# Patient Record
Sex: FEMALE
[De-identification: names, ages, dates, MRNs, and addresses within clinical notes are randomized per-mention and may not be internally consistent; named-entity substitution may affect disease eponyms.]

---

## 2021-08-25 ENCOUNTER — HOSPITAL ENCOUNTER (INPATIENT)
Dept: HOSPITAL 5 - 3A | Age: 61
LOS: 6 days | Discharge: HOME | DRG: 885 | End: 2021-08-31
Attending: PSYCHIATRY & NEUROLOGY | Admitting: PSYCHIATRY & NEUROLOGY
Payer: MEDICAID

## 2021-08-25 DIAGNOSIS — I11.0: ICD-10-CM

## 2021-08-25 DIAGNOSIS — I50.9: ICD-10-CM

## 2021-08-25 DIAGNOSIS — Z79.01: ICD-10-CM

## 2021-08-25 DIAGNOSIS — F23: Primary | ICD-10-CM

## 2021-08-25 DIAGNOSIS — E78.5: ICD-10-CM

## 2021-08-25 DIAGNOSIS — Z86.711: ICD-10-CM

## 2021-08-25 DIAGNOSIS — Z85.43: ICD-10-CM

## 2021-08-25 DIAGNOSIS — F41.9: ICD-10-CM

## 2021-08-25 DIAGNOSIS — Z85.3: ICD-10-CM

## 2021-08-25 DIAGNOSIS — G35: ICD-10-CM

## 2021-08-25 DIAGNOSIS — F31.9: ICD-10-CM

## 2021-08-25 DIAGNOSIS — J44.9: ICD-10-CM

## 2021-08-25 PROCEDURE — 36415 COLL VENOUS BLD VENIPUNCTURE: CPT

## 2021-08-25 PROCEDURE — 85730 THROMBOPLASTIN TIME PARTIAL: CPT

## 2021-08-25 PROCEDURE — 82565 ASSAY OF CREATININE: CPT

## 2021-08-25 PROCEDURE — 85027 COMPLETE CBC AUTOMATED: CPT

## 2021-08-25 PROCEDURE — 99283 EMERGENCY DEPT VISIT LOW MDM: CPT

## 2021-08-25 PROCEDURE — 80053 COMPREHEN METABOLIC PANEL: CPT

## 2021-08-25 PROCEDURE — 85025 COMPLETE CBC W/AUTO DIFF WBC: CPT

## 2021-08-25 PROCEDURE — 83036 HEMOGLOBIN GLYCOSYLATED A1C: CPT

## 2021-08-25 PROCEDURE — 85610 PROTHROMBIN TIME: CPT

## 2021-08-25 PROCEDURE — 80061 LIPID PANEL: CPT

## 2021-08-26 ENCOUNTER — HOSPITAL ENCOUNTER (EMERGENCY)
Dept: HOSPITAL 5 - ED | Age: 61
Discharge: HOME | End: 2021-08-26
Payer: MEDICAID

## 2021-08-26 VITALS — SYSTOLIC BLOOD PRESSURE: 123 MMHG | DIASTOLIC BLOOD PRESSURE: 61 MMHG

## 2021-08-26 DIAGNOSIS — Z04.6: ICD-10-CM

## 2021-08-26 DIAGNOSIS — I12.0: ICD-10-CM

## 2021-08-26 DIAGNOSIS — Z88.8: ICD-10-CM

## 2021-08-26 DIAGNOSIS — S39.012A: Primary | ICD-10-CM
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
## 2021-08-26 DIAGNOSIS — M54.2: ICD-10-CM

## 2021-08-26 DIAGNOSIS — Z88.0: ICD-10-CM

## 2021-08-26 DIAGNOSIS — F17.200: ICD-10-CM
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
## 2021-08-26 DIAGNOSIS — Z88.1: ICD-10-CM

## 2021-08-26 LAB
ALBUMIN SERPL-MCNC: 3.3 G/DL (ref 3.9–5)
ALT SERPL-CCNC: 22 UNITS/L (ref 7–56)
APTT BLD: 27.5 SEC. (ref 24.2–36.6)
BASOPHILS # (AUTO): 0.1 K/MM3 (ref 0–0.1)
BASOPHILS NFR BLD AUTO: 1.5 % (ref 0–1.8)
BUN SERPL-MCNC: 11 MG/DL (ref 7–17)
BUN/CREAT SERPL: 18 %
CALCIUM SERPL-MCNC: 9 MG/DL (ref 8.4–10.2)
EOSINOPHIL # BLD AUTO: 0.4 K/MM3 (ref 0–0.4)
EOSINOPHIL NFR BLD AUTO: 6 % (ref 0–4.3)
HCT VFR BLD CALC: 32.6 % (ref 30.3–42.9)
HCT VFR BLD CALC: 32.9 % (ref 30.3–42.9)
HDLC SERPL-MCNC: 29 MG/DL (ref 40–59)
HEMOLYSIS INDEX: 40
HGB BLD-MCNC: 10.7 GM/DL (ref 10.1–14.3)
HGB BLD-MCNC: 11 GM/DL (ref 10.1–14.3)
INR PPP: 1 (ref 0.87–1.13)
LYMPHOCYTES # BLD AUTO: 1.9 K/MM3 (ref 1.2–5.4)
LYMPHOCYTES NFR BLD AUTO: 30.3 % (ref 13.4–35)
MCHC RBC AUTO-ENTMCNC: 33 % (ref 30–34)
MCHC RBC AUTO-ENTMCNC: 34 % (ref 30–34)
MCV RBC AUTO: 91 FL (ref 79–97)
MCV RBC AUTO: 92 FL (ref 79–97)
MONOCYTES # (AUTO): 0.6 K/MM3 (ref 0–0.8)
MONOCYTES % (AUTO): 9 % (ref 0–7.3)
PLATELET # BLD: 207 K/MM3 (ref 140–440)
PLATELET # BLD: 209 K/MM3 (ref 140–440)
RBC # BLD AUTO: 3.54 M/MM3 (ref 3.65–5.03)
RBC # BLD AUTO: 3.62 M/MM3 (ref 3.65–5.03)

## 2021-08-26 PROCEDURE — 99283 EMERGENCY DEPT VISIT LOW MDM: CPT

## 2021-08-26 RX ADMIN — FUROSEMIDE SCH MG: 20 TABLET ORAL at 09:28

## 2021-08-26 RX ADMIN — MONTELUKAST SCH MG: 10 TABLET, FILM COATED ORAL at 18:03

## 2021-08-26 RX ADMIN — BACLOFEN SCH MG: 10 TABLET ORAL at 09:28

## 2021-08-26 RX ADMIN — BACLOFEN SCH MG: 10 TABLET ORAL at 14:31

## 2021-08-26 RX ADMIN — PREGABALIN SCH MG: 50 CAPSULE ORAL at 21:54

## 2021-08-26 RX ADMIN — PREGABALIN SCH MG: 50 CAPSULE ORAL at 14:30

## 2021-08-26 RX ADMIN — VENLAFAXINE HYDROCHLORIDE SCH MG: 75 CAPSULE, EXTENDED RELEASE ORAL at 09:28

## 2021-08-26 RX ADMIN — APIXABAN SCH MG: 5 TABLET, FILM COATED ORAL at 09:53

## 2021-08-26 RX ADMIN — BACLOFEN SCH MG: 10 TABLET ORAL at 21:10

## 2021-08-26 RX ADMIN — APIXABAN SCH MG: 5 TABLET, FILM COATED ORAL at 21:10

## 2021-08-26 RX ADMIN — PREGABALIN SCH MG: 50 CAPSULE ORAL at 09:55
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
## 2021-08-26 NOTE — HISTORY AND PHYSICAL REPORT
History of Present Illness


Date of examination: 08/26/21


Date of admission: 


08/26/21 01:56





History of present illness: 


HPI:


61-year-old female with past medical history of bipolar disorder, anxiety, who 

presents from [facility] due to aggressive behavior and 

hallucinations.


Has had numerous hospitalization for acute psych disorders. Patient had no acute

complaints on encounter of chest pain, headache, nausea, vomiting, shortness of 

breath, abdominal pain. She does admit to extensive medical history. She is 

currently being treated for metastatic breast cancer. Chemotherapy was withheld 

at this time due to development of cardiomyopathy. Patient however appears well 

compensated. She is resting comfortably on 3 L nasal cannula which is her 

baseline supplemental O2 requirement.





A 12 point review of systems was conducted and was negative except for stated 

above.





PMHx: depression, bipolar disorder, anxiety ,breast cancer and ovarian cancer 

s/p mastectomy (still undergoing active chemotherapy), multiple sclerosis, 

congestive heart failure, HTN, COPD on home O2, history of PE on apixaban , RLE 

fracture s/p reduction/int fixation (several months ago)





PSHx: mastectomy





FHx: reviewed non-contributory





SHx:


Tobacco use-denies


ETOH Use-denies


Recreational Drug Use-denies


Occupation-unemployed (former )


PCP-has one but doesn't know name.


Lives with father who supports patient financially.


Family: 


- 


- father: John Keene





Medications and Allergies


                                    Allergies











Allergy/AdvReac Type Severity Reaction Status Date / Time


 


Penicillins AdvReac  Hives Verified 08/25/21 22:57


 


succinylcholine AdvReac  Unknown Verified 08/25/21 23:00


 


sulfamethoxazole AdvReac  Hives Verified 08/25/21 23:03











                                Home Medications











 Medication  Instructions  Recorded  Confirmed  Last Taken  Type


 


Apixaban [Eliquis] 5 mg PO BID 08/26/21 08/26/21 Unknown History


 


Atorvastatin [Lipitor] 40 mg PO HS 08/26/21 08/26/21 Unknown History


 


Baclofen [Lioresal] 10 mg PO TID 08/26/21 08/26/21 Unknown History


 


Buspirone HCl [busPIRone] 7.5 mg PO BID 08/26/21 08/26/21 Unknown History


 


Furosemide [Lasix TAB] 20 mg PO DAILY 08/26/21 08/26/21 Unknown History


 


Letrozole (Nf) [Femara (Nf)] 2.5 mg PO DAILY 08/26/21 08/26/21 Unknown History


 


Linaclotide [Linzess] 290 mcg PO DAILY 08/26/21 08/26/21 Unknown History


 


Montelukast Sodium 10 mg PO QPM 08/26/21 08/26/21 Unknown History


 


Ondansetron HCl [Zofran] 8 mg PO Q8HR PRN 08/26/21 08/26/21 Unknown History


 


Pregabalin [Lyrica] 200 mg PO TID 08/26/21 08/26/21 Unknown History


 


QUEtiapine [SEROquel] 250 mg PO HS 08/26/21 08/26/21 Unknown History


 


Ropinirole HCl [rOPINIRole] 6 mg PO BID 08/26/21 08/26/21 Unknown History


 


Venlafaxine HCl [Effexor Xr] 150 mg PO DAILY 08/26/21 08/26/21 Unknown History


 


amLODIPine 5 mg PO DAILY 08/26/21 08/26/21 Unknown History


 


carvediloL [Coreg] 12.5 mg PO BID 08/26/21 08/26/21 Unknown History











Active Meds: 


Active Medications





Amlodipine Besylate (Amlodipine 5 Mg Tab)  5 mg PO DAILY [facility]


   Last Admin: 08/26/21 09:29 Dose:  5 mg


   Documented by: 


Apixaban (Apixaban 5 Mg Tab)  5 mg PO Q12HR [facility]; Protocol


   Last Admin: 08/26/21 09:53 Dose:  5 mg


   Documented by: 


Atorvastatin Calcium (Atorvastatin 40 Mg Tab)  40 mg PO [facility]


Baclofen (Baclofen 10 Mg Tab)  10 mg PO TID [facility]


   Last Admin: 08/26/21 09:28 Dose:  10 mg


   Documented by: 


Buspirone HCl (Buspirone 5 Mg Tab)  7.5 mg PO BID [facility]


   Last Admin: 08/26/21 09:27 Dose:  7.5 mg


   Documented by: 


Carvedilol (Carvedilol 12.5 Mg Tab)  12.5 mg PO BID@0800,1700 [facility]


   Last Admin: 08/26/21 09:28 Dose:  12.5 mg


   Documented by: 


Furosemide (Furosemide 20 Mg Tab)  20 mg PO DAILY [facility]


   Last Admin: 08/26/21 09:28 Dose:  20 mg


   Documented by: 


Miscellaneous Medication (Letrozole (Nf))  2.5 mg PO DAILY [facility]


Montelukast Sodium (Montelukast 10 Mg Tab)  10 mg PO QPM [facility]


Ondansetron HCl (Ondansetron 4 Mg Odt Tab)  8 mg PO Q8H PRN


   PRN Reason: Nausea And Vomiting


Pregabalin (Pregabalin 50 Mg Cap)  200 mg PO TID [facility]


   Last Admin: 08/26/21 09:55 Dose:  200 mg


   Documented by: 


Quetiapine Fumarate (Quetiapine 200 Mg Tab)  200 mg PO QHS [facility]


   Last Admin: 08/26/21 02:46 Dose:  200 mg


   Documented by: 


Quetiapine Fumarate (Quetiapine 25 Mg Tab)  50 mg PO QHS [facility]


   Last Admin: 08/26/21 02:46 Dose:  50 mg


   Documented by: 


Ropinirole HCl (Ropinirole 1 Mg Tab)  6 mg PO BID [facility]


   Last Admin: 08/26/21 09:27 Dose:  6 mg


   Documented by: 


Venlafaxine HCl (Venlafaxine Xr 75 Mg Cap)  150 mg PO QDAY [facility]


   Last Admin: 08/26/21 09:28 Dose:  150 mg


   Documented by: 


Ziprasidone (Ziprasidone Mesylate 20 Mg Vial)  20 mg IM Q4H PRN


   PRN Reason: Agitation











Review of Systems


All systems: negative (For symptoms outlined in HPI.)





Exam





- Physical Exam


Narrative exam: 





Physical Exam:


Constitutional: Alert, cooperative. No acute distress


Head, Ears, Nose: Normocephalic, atraumatic. External ears, nose normal


Eyes: Conjunctivae/corneas clear. No icterus. No ptosis.


Neck: Supple, no meningeal signs


Oral: dentition fair, no thrush


Cardiovascular: S1, S2 normal. 


Respiratory: Poor air movement. Requiring 3 L nasal cannula


GI: Soft, non-tender; bowel sounds normal. No peritoneal signs. 


Musculoskeletal: No pedal edema, no cyanosis.


Skin: No rash or abscess, see nursing assessment for full skin exam


Hem/Lymphatic: No palpable cervical or supraclavicular nodes. No lymphangitis


Psych: Alert and oriented x3 however patient is severely paranoid and anxious


Neurological: Awake, alert, oriented. No gross abnormality





- Constitutional


Vitals: 


                                        











Temp Pulse Resp BP Pulse Ox


 


 98.6 F   84   16   105/71   96 


 


 08/26/21 08:53  08/26/21 09:29  08/26/21 08:53  08/26/21 09:29  08/26/21 08:53














Results





- Labs


CBC & Chem 7: 


                                 08/26/21 09:55





                                 08/26/21 09:55


Labs: 


                             Laboratory Last Values











WBC  5.6 K/mm3 (4.5-11.0)   08/26/21  09:55    


 


RBC  3.62 M/mm3 (3.65-5.03)  L  08/26/21  09:55    


 


Hgb  11.0 gm/dl (10.1-14.3)   08/26/21  09:55    


 


Hct  32.9 % (30.3-42.9)   08/26/21  09:55    


 


MCV  91 fl (79-97)   08/26/21  09:55    


 


MCH  30 pg (28-32)   08/26/21  09:55    


 


MCHC  34 % (30-34)   08/26/21  09:55    


 


RDW  15.1 % (13.2-15.2)   08/26/21  09:55    


 


Plt Count  209 K/mm3 (140-440)   08/26/21  09:55    


 


Lymph % (Auto)  30.3 % (13.4-35.0)   08/26/21  05:59    


 


Mono % (Auto)  9.0 % (0.0-7.3)  H  08/26/21  05:59    


 


Eos % (Auto)  6.0 % (0.0-4.3)  H  08/26/21  05:59    


 


Baso % (Auto)  1.5 % (0.0-1.8)   08/26/21  05:59    


 


Lymph # (Auto)  1.9 K/mm3 (1.2-5.4)   08/26/21  05:59    


 


Mono # (Auto)  0.6 K/mm3 (0.0-0.8)   08/26/21  05:59    


 


Eos # (Auto)  0.4 K/mm3 (0.0-0.4)   08/26/21  05:59    


 


Baso # (Auto)  0.1 K/mm3 (0.0-0.1)   08/26/21  05:59    


 


Seg Neutrophils %  53.2 % (40.0-70.0)   08/26/21  05:59    


 


Seg Neutrophils #  3.3 K/mm3 (1.8-7.7)   08/26/21  05:59    


 


PT  13.8 Sec. (12.2-14.9)   08/26/21  09:55    


 


INR  1.00  (0.87-1.13)   08/26/21  09:55    


 


APTT  27.5 Sec. (24.2-36.6)   08/26/21  09:55    


 


Sodium  135 mmol/L (137-145)  L  08/26/21  05:59    


 


Potassium  3.7 mmol/L (3.6-5.0)   08/26/21  05:59    


 


Chloride  97.3 mmol/L ()  L  08/26/21  05:59    


 


Carbon Dioxide  29 mmol/L (22-30)   08/26/21  05:59    


 


Anion Gap  12 mmol/L  08/26/21  05:59    


 


BUN  11 mg/dL (7-17)   08/26/21  05:59    


 


Creatinine  0.7 mg/dL (0.6-1.2)   08/26/21  09:55    


 


Estimated GFR  > 60 ml/min  08/26/21  09:55    


 


BUN/Creatinine Ratio  18 %  08/26/21  05:59    


 


Glucose  94 mg/dL ()   08/26/21  05:59    


 


Hemoglobin A1c  5.6 % (4-6)   08/26/21  05:59    


 


Calcium  9.0 mg/dL (8.4-10.2)   08/26/21  05:59    


 


Total Bilirubin  0.20 mg/dL (0.1-1.2)   08/26/21  05:59    


 


AST  24 units/L (5-40)   08/26/21  05:59    


 


ALT  22 units/L (7-56)   08/26/21  05:59    


 


Alkaline Phosphatase  111 units/L ()   08/26/21  05:59    


 


Total Protein  6.9 g/dL (6.3-8.2)   08/26/21  05:59    


 


Albumin  3.3 g/dL (3.9-5)  L  08/26/21  05:59    


 


Albumin/Globulin Ratio  0.9 %  08/26/21  05:59    


 


Triglycerides  277 mg/dL (2-149)  H  08/26/21  05:59    


 


Cholesterol  162 mg/dL ()   08/26/21  05:59    


 


LDL Cholesterol Direct  103 mg/dL ()   08/26/21  05:59    


 


HDL Cholesterol  29 mg/dL (40-59)  L  08/26/21  05:59    


 


Cholesterol/HDL Ratio  5.58 %  08/26/21  05:59    











Pond/IV: 


                                        





Voiding Method                   Toilet











Assessment and Plan


Assessment and plan: 


Acute psychosis with hallucinations


Bipolar disorder


History of chemotherapy-induced cardiomyopathyunclear ejection fraction. On 3 L

 nasal cannula


Hypertension


Hyperlipidemia - , Total Chol 162


History of Pulmonary embolism x3


History of metastatic breast cancer


History of Ovarian Cancer


Anxiety/Depression





Plan:


- Resume all home medication


Monitor for signs and symptoms of bleeding as patient is on anticoagulation


- monitor BP, I/O's, cautious use of fluids in light of CHF


- supplemental oxygen


- psychiatry illness/medication management per psych team

## 2021-08-26 NOTE — HISTORY AND PHYSICAL REPORT
GP History & Physical





- History of Present Illness


Date of admission: 08/25/21


Date of Examination: 08/26/21


Reason for Admission: Danger to self, Danger to others


Chief Complaint: Aggeressive behavior/hallucinations


History of Present Illness: 


 


Renita Deluca is a 61 year old female with a history of Bipolar, Anxiety and 

multiple medical diagnoses who was admitted from [facility] on an ISA 

due to aggressive behavior and hallucinations. In my interview with the patient,

she presents with some confusion. The patient is unable to states her 

psychiatric diagnosis or what medications she is taking. The patient states that

she has never seen a psychiatric but states her PCP prescribes her psychotropic 

medications. The patient states her  has Alzheimer and they reside at her

father's house stating " My daddy wants to get me out of his house, I take care 

of my . " She denies any current suicidal/homicidal ideation and denies 

hallucinations.





Per ISA note from daughter: "My mother for years has exhibited altered mental 

states like seeing things not there, bugs " blue people"  and has been having 

spontaneous mood changes for 10+ years. She is now on at least seventeen- twenty

medications and has sbeen seen at both hospitals here in [facility] for this issue. 

Tito has sent her to [place] for 2 weeks. She can be hostile, screaming, 

calling 911 on my grandfather for not answering phone, throwing rocks at widows.

she blames people for things that never happened..."





PAST PSYCHIATRIC HISTORY:


Diagnoses: Bipolar, Anxiety


Suicide attempts or Self-harm behavior: Denies


Prior psychiatric hospitalizations: Yes 


Substance Abuse history: Denies


Previous psychiatric medications tried: Currently on Buspar, Seroquel, Effexor


Outpatient treatment:unknown





PAST MEDICAL HISTORY: None reported or document





Family Psychiatric History: None reported or documented





SOCIAL HISTORY


Marital Status: 


Living Arrangements: Lives with 


Employment Status: unknown


Access to guns/weapons: Yes ( owns a gun since 36 years)


Education:10th grade


History of Abuse:Unknown


Legal History: Denies





REVIEW OF SYSTEMS  


Constitutional: Negative for weight loss


ENT: Negative for stridor


Respiratory: Negative for cough or hemoptysis


All other systems reviewed and are negative





MENTAL STATUS EXAMINATION


General Appearance and Behavior: Age appropriate, good hygiene, wearing 

appropriate clothes.


Cooperation: Cooperative


Psychomotor Behavior: Psychomotor normal


Mood:"OK'


Affect and affective range: Incongruent with stated mood


Thought Process: Confused/ circumstantial


Thought Content:    Not Suicidal


Speech: Normal volume, Regular rate and rhythm, 


Suicidal Ideation: Denies


Homicidal Ideation: Denies


Hallucinations: Denies


Delusions: None elicited


Impulse Control: Questionable


Insight and Judgment: Limited, poor judgment


Memory: Abnormal


Attention: Distractible


Orientation: alert and oriented








 Assessment and Plan 


(1)Psychosis NOS


Current Visit: Yes   Status: Acute


Treatment Plan


 Patient admitted for inpatient psychiatric evaluation, medication adjustment 

and close monitoring


 The patient's behavior, mood, sleep and appetite will be closely monitored.


 Patient enrolled in individual and group therapeutic sessions and encouraged to

attend.


 Patient provided with a safe and structured environment.


 Patient's physical health needs will be addressed by the Hospitalist. 

Hospitalist Consulted


 Labs including CBC, CMP, Lipid profile and Hemoglobin A1C levels ordered for 

baseline reference


 Social Assessment will be completed and the  will work with 

patient and family to ensure a suitable and safe disposition


 Medication adjustment will be made as clinically indicated


Continue home medications





 Usual Wellness Restoration/Preservation:


 - Start Trazodone 50 mg po QHS & 50 mg po QHS PRN between 10 PM & 2 AM for 

insomnia


 - Start Melatonin 5 mg po QHS to promote circadian rhythm


 The patient agreed on the treatment plan, understood the risk, benefit, 

alternative treatment, potential consequence of no treatment, and gave informed 

consent.


 Estimated days: 6


 Post hospital care: primary care provider, psychiatric provider


Case staffed with Dr. Chacon








Medications and Allergies


Legal Status: Involuntary


Reaction to Hospitalization: Accepting





Medications and Allergies


                                    Allergies











Allergy/AdvReac Type Severity Reaction Status Date / Time


 


Penicillins AdvReac  Hives Verified 08/25/21 22:57


 


succinylcholine AdvReac  Unknown Verified 08/25/21 23:00


 


sulfamethoxazole AdvReac  Hives Verified 08/25/21 23:03











                                Home Medications











 Medication  Instructions  Recorded  Confirmed  Last Taken  Type


 


Apixaban [Eliquis] 5 mg PO BID 08/26/21 08/26/21 Unknown History


 


Atorvastatin [Lipitor] 40 mg PO HS 08/26/21 08/26/21 Unknown History


 


Baclofen [Lioresal] 10 mg PO TID 08/26/21 08/26/21 Unknown History


 


Buspirone HCl [busPIRone] 7.5 mg PO BID 08/26/21 08/26/21 Unknown History


 


Furosemide [Lasix TAB] 20 mg PO DAILY 08/26/21 08/26/21 Unknown History


 


Letrozole (Nf) [Femara (Nf)] 2.5 mg PO DAILY 08/26/21 08/26/21 Unknown History


 


Linaclotide [Linzess] 290 mcg PO DAILY 08/26/21 08/26/21 Unknown History


 


Montelukast Sodium 10 mg PO QPM 08/26/21 08/26/21 Unknown History


 


Ondansetron HCl [Zofran] 8 mg PO Q8HR PRN 08/26/21 08/26/21 Unknown History


 


Pregabalin [Lyrica] 200 mg PO TID 08/26/21 08/26/21 Unknown History


 


QUEtiapine [SEROquel] 250 mg PO HS 08/26/21 08/26/21 Unknown History


 


Ropinirole HCl [rOPINIRole] 6 mg PO BID 08/26/21 08/26/21 Unknown History


 


Venlafaxine HCl [Effexor Xr] 150 mg PO DAILY 08/26/21 08/26/21 Unknown History


 


amLODIPine 5 mg PO DAILY 08/26/21 08/26/21 Unknown History


 


carvediloL [Coreg] 12.5 mg PO BID 08/26/21 08/26/21 Unknown History











Active Meds: 


Active Medications





Baclofen (Baclofen 10 Mg Tab)  10 mg PO TID [facility]


Carvedilol (Carvedilol 12.5 Mg Tab)  12.5 mg PO BID@0800,1700 [facility]


   Last Admin: 08/26/21 02:51 Dose:  12.5 mg


   Documented by: 


Quetiapine Fumarate (Quetiapine 200 Mg Tab)  200 mg PO QHS [facility]


   Last Admin: 08/26/21 02:46 Dose:  200 mg


   Documented by: 


Quetiapine Fumarate (Quetiapine 25 Mg Tab)  50 mg PO QHS [facility]


   Last Admin: 08/26/21 02:46 Dose:  50 mg


   Documented by: 


Ropinirole HCl (Ropinirole 1 Mg Tab)  6 mg PO BID [facility]


   Last Admin: 08/26/21 02:46 Dose:  6 mg


   Documented by: 











Results





- Results


Labs/Vitals: 


                             Laboratory Last Values











WBC  6.1 K/mm3 (4.5-11.0)   08/26/21  05:59    


 


RBC  3.54 M/mm3 (3.65-5.03)  L  08/26/21  05:59    


 


Hgb  10.7 gm/dl (10.1-14.3)   08/26/21  05:59    


 


Hct  32.6 % (30.3-42.9)   08/26/21  05:59    


 


MCV  92 fl (79-97)   08/26/21  05:59    


 


MCH  30 pg (28-32)   08/26/21  05:59    


 


MCHC  33 % (30-34)   08/26/21  05:59    


 


RDW  14.9 % (13.2-15.2)   08/26/21  05:59    


 


Plt Count  207 K/mm3 (140-440)   08/26/21  05:59    


 


Lymph % (Auto)  30.3 % (13.4-35.0)   08/26/21  05:59    


 


Mono % (Auto)  9.0 % (0.0-7.3)  H  08/26/21  05:59    


 


Eos % (Auto)  6.0 % (0.0-4.3)  H  08/26/21  05:59    


 


Baso % (Auto)  1.5 % (0.0-1.8)   08/26/21  05:59    


 


Lymph # (Auto)  1.9 K/mm3 (1.2-5.4)   08/26/21  05:59    


 


Mono # (Auto)  0.6 K/mm3 (0.0-0.8)   08/26/21  05:59    


 


Eos # (Auto)  0.4 K/mm3 (0.0-0.4)   08/26/21  05:59    


 


Baso # (Auto)  0.1 K/mm3 (0.0-0.1)   08/26/21  05:59    


 


Seg Neutrophils %  53.2 % (40.0-70.0)   08/26/21  05:59    


 


Seg Neutrophils #  3.3 K/mm3 (1.8-7.7)   08/26/21  05:59    


 


Sodium  135 mmol/L (137-145)  L  08/26/21  05:59    


 


Potassium  3.7 mmol/L (3.6-5.0)   08/26/21  05:59    


 


Chloride  97.3 mmol/L ()  L  08/26/21  05:59    


 


Carbon Dioxide  29 mmol/L (22-30)   08/26/21  05:59    


 


Anion Gap  12 mmol/L  08/26/21  05:59    


 


BUN  11 mg/dL (7-17)   08/26/21  05:59    


 


Creatinine  0.6 mg/dL (0.6-1.2)   08/26/21  05:59    


 


Estimated GFR  > 60 ml/min  08/26/21  05:59    


 


BUN/Creatinine Ratio  18 %  08/26/21  05:59    


 


Glucose  94 mg/dL ()   08/26/21  05:59    


 


Hemoglobin A1c  5.6 % (4-6)   08/26/21  05:59    


 


Calcium  9.0 mg/dL (8.4-10.2)   08/26/21  05:59    


 


Total Bilirubin  0.20 mg/dL (0.1-1.2)   08/26/21  05:59    


 


AST  24 units/L (5-40)   08/26/21  05:59    


 


ALT  22 units/L (7-56)   08/26/21  05:59    


 


Alkaline Phosphatase  111 units/L ()   08/26/21  05:59    


 


Total Protein  6.9 g/dL (6.3-8.2)   08/26/21  05:59    


 


Albumin  3.3 g/dL (3.9-5)  L  08/26/21  05:59    


 


Albumin/Globulin Ratio  0.9 %  08/26/21  05:59    


 


Triglycerides  277 mg/dL (2-149)  H  08/26/21  05:59    


 


Cholesterol  162 mg/dL ()   08/26/21  05:59    


 


LDL Cholesterol Direct  103 mg/dL ()   08/26/21  05:59    


 


HDL Cholesterol  29 mg/dL (40-59)  L  08/26/21  05:59    


 


Cholesterol/HDL Ratio  5.58 %  08/26/21  05:59    








                                Last Vital Signs











Temp  98.7 F   08/26/21 01:35


 


Pulse  84   08/26/21 02:51


 


Resp  18   08/26/21 02:45


 


BP  132/69   08/26/21 02:51


 


Pulse Ox  97   08/26/21 01:35














Physical Examination





- Constitutional


Vitals: 


                                   Vital Signs











Temp Pulse Resp BP Pulse Ox


 


 98.7 F   84   18   132/69   97 


 


 08/26/21 01:35  08/26/21 02:51  08/26/21 02:45  08/26/21 02:51  08/26/21 01:35








                           Temperature -Last 24 Hours











Temperature                    98.7 F

















Mental Status Exam





- Vital signs


                                Last Vital Signs











Temp  98.7 F   08/26/21 01:35


 


Pulse  84   08/26/21 02:51


 


Resp  18   08/26/21 02:45


 


BP  132/69   08/26/21 02:51


 


Pulse Ox  97   08/26/21 01:35














Physician Certification





- Certification Statement


Physician Certification Statement: 


This is an acknowledgement statement that RENITA DELUCA is a 61 year old F who 

requires inpatient psychiatric admission for treatment which could reasonably be

 expected to improve the patient's condition for 





Estimated period of time patient will need to remain in the hospital: [ ]





Plan for post-hospital care: [ ]

## 2021-08-27 RX ADMIN — APIXABAN SCH MG: 5 TABLET, FILM COATED ORAL at 10:16

## 2021-08-27 RX ADMIN — VENLAFAXINE HYDROCHLORIDE SCH MG: 75 CAPSULE, EXTENDED RELEASE ORAL at 10:09

## 2021-08-27 RX ADMIN — BACLOFEN SCH MG: 10 TABLET ORAL at 10:16

## 2021-08-27 RX ADMIN — BACLOFEN SCH MG: 10 TABLET ORAL at 21:00

## 2021-08-27 RX ADMIN — PREGABALIN SCH MG: 50 CAPSULE ORAL at 15:49

## 2021-08-27 RX ADMIN — BACLOFEN SCH MG: 10 TABLET ORAL at 15:49

## 2021-08-27 RX ADMIN — LINACLOTIDE SCH MCG: 290 CAPSULE, GELATIN COATED ORAL at 10:17

## 2021-08-27 RX ADMIN — APIXABAN SCH MG: 5 TABLET, FILM COATED ORAL at 21:02

## 2021-08-27 RX ADMIN — PREGABALIN SCH MG: 50 CAPSULE ORAL at 10:15

## 2021-08-27 RX ADMIN — MONTELUKAST SCH MG: 10 TABLET, FILM COATED ORAL at 18:01

## 2021-08-27 RX ADMIN — FUROSEMIDE SCH MG: 20 TABLET ORAL at 10:16

## 2021-08-27 RX ADMIN — PREGABALIN SCH MG: 50 CAPSULE ORAL at 20:59

## 2021-08-27 NOTE — PROGRESS NOTE
Subjective





- Reason for Consult


Consult date: 08/27/21


Reason for consult: Aggressive behavior/ hallucinations





- Chief Complaint


Chief complaint: 


 08/27/2021: The patient was seen resting in bed, she reports doing well. She 

states that she spoke to her  yesterday and their conversation went well.

She reports sleep and appetite as good " I have gotten some sleep and it made a 

big difference." She denies any current suicidal/homicidal ideation and denies 

hallucinations. Per nurse, the patient had a quiet night. No changes made today.





REVIEW OF SYSTEMS  


Constitutional: Negative for weight loss


ENT: Negative for stridor


Respiratory: Negative for cough or hemoptysis


All other systems reviewed and are negative





MENTAL STATUS EXAMINATION


General Appearance and Behavior: Age appropriate, good hygiene, wearing 

appropriate clothes.


Cooperation: Cooperative


Psychomotor Behavior: Psychomotor normal


Mood:"OK'


Affect and affective range: Incongruent with stated mood


Thought Process: Confused/ circumstantial


Thought Content:    Not Suicidal


Speech: Normal volume, Regular rate and rhythm, 


Suicidal Ideation: Denies


Homicidal Ideation: Denies


Hallucinations: Denies


Delusions: None elicited


Impulse Control: Questionable


Insight and Judgment: Limited, poor judgment


Memory: Abnormal


Attention: Distractible


Orientation: alert and oriented








 Assessment and Plan 


(1)Psychosis NOS


Current Visit: Yes   Status: Acute


Treatment Plan


 Patient admitted for inpatient psychiatric evaluation, medication adjustment 

and close monitoring


 The patient's behavior, mood, sleep and appetite will be closely monitored.


 Patient enrolled in individual and group therapeutic sessions and encouraged to

attend.


 Patient provided with a safe and structured environment.


 Patient's physical health needs will be addressed by the Hospitalist. 

Hospitalist Consulted


 Labs including CBC, CMP, Lipid profile and Hemoglobin A1C levels ordered for 

baseline reference


 Social Assessment will be completed and the  will work with 

patient and family to ensure a suitable and safe disposition


 Medication adjustment will be made as clinically indicated


Continue home medications


No changes made today


 Usual Wellness Restoration/Preservation:


 - Start Trazodone 50 mg po QHS & 50 mg po QHS PRN between 10 PM & 2 AM for 

insomnia


 - Start Melatonin 5 mg po QHS to promote circadian rhythm


 The patient agreed on the treatment plan, understood the risk, benefit, 

alternative treatment, potential consequence of no treatment, and gave informed 

consent.


 Estimated days: 5


 Post hospital care: primary care provider, psychiatric provider


Case staffed with Dr. Chacon








Medications and Allergies


Legal Status: Involuntary


Reaction to Hospitalization: Accepting





Medications and Allergies


                                        








Mental Status Exam





- Vital signs


                                Last Vital Signs











Temp  98.6 F   08/26/21 19:34


 


Pulse  79   08/26/21 19:34


 


Resp  18   08/26/21 19:34


 


BP  130/62   08/26/21 19:34


 


Pulse Ox  93   08/26/21 19:34

## 2021-08-28 RX ADMIN — PREGABALIN SCH MG: 50 CAPSULE ORAL at 21:32

## 2021-08-28 RX ADMIN — BACLOFEN SCH MG: 10 TABLET ORAL at 14:02

## 2021-08-28 RX ADMIN — FUROSEMIDE SCH MG: 20 TABLET ORAL at 09:22

## 2021-08-28 RX ADMIN — VENLAFAXINE HYDROCHLORIDE SCH MG: 75 CAPSULE, EXTENDED RELEASE ORAL at 09:22

## 2021-08-28 RX ADMIN — APIXABAN SCH MG: 5 TABLET, FILM COATED ORAL at 09:23

## 2021-08-28 RX ADMIN — BACLOFEN SCH MG: 10 TABLET ORAL at 20:30

## 2021-08-28 RX ADMIN — APIXABAN SCH MG: 5 TABLET, FILM COATED ORAL at 21:09

## 2021-08-28 RX ADMIN — BACLOFEN SCH MG: 10 TABLET ORAL at 08:33

## 2021-08-28 RX ADMIN — PREGABALIN SCH MG: 50 CAPSULE ORAL at 08:34

## 2021-08-28 RX ADMIN — LINACLOTIDE SCH MCG: 290 CAPSULE, GELATIN COATED ORAL at 09:21

## 2021-08-28 RX ADMIN — MONTELUKAST SCH MG: 10 TABLET, FILM COATED ORAL at 17:01

## 2021-08-28 RX ADMIN — PREGABALIN SCH MG: 50 CAPSULE ORAL at 14:02

## 2021-08-28 NOTE — PROGRESS NOTE
Subjective


Date of service: 08/28/21


Principal diagnosis: Psychosis


Subjective Comment: 


The patient was seen today. She is difficult to follow and is having flight of 

ideas. She is delusional. She is talking about being bitten by a spider, and 

seeing a lot of them. She tells me her daughter and her daddy wanted her here. 

She says her daughter doesn't like her very well. The patient says she takes 

care of her  who say Alzheimer. She then says "nobody liked me talking 

about the spiders. The house is full of them." She denies SI/HI or 

hallucinations. She says "I'm not delusional or anything." The patient then 

laughs and starts talking about her "daughter and her daddy wanted her here."





REVIEW OF SYSTEMS  


Constitutional: Negative for weight loss


ENT: Negative for stridor


Respiratory: Negative for cough or hemoptysis


All other systems reviewed and are negative





MENTAL STATUS EXAMINATION


General Appearance and Behavior: Age appropriate, good hygiene, wearing 

appropriate clothes.


Cooperation: Cooperative


Psychomotor Behavior: Psychomotor normal


Mood: fine


Affect and affective range: Incongruent with stated mood


Thought Process: Confused/ circumstantial


Thought Content: delusions


Speech: Normal volume, Regular rate and rhythm, 


Suicidal Ideation: Denies


Homicidal Ideation: Denies


Hallucinations: Denies


Delusions: Yes


Impulse Control: Questionable


Insight and Judgment: Limited, poor judgment


Memory: Abnormal


Attention: Distractible


Orientation: alert and oriented








 Assessment and Plan 


(1)Psychosis NOS


Current Visit: Yes   Status: Acute





Treatment Plan


 Patient admitted for inpatient psychiatric evaluation, medication adjustment 

and close monitoring


 The patient's behavior, mood, sleep and appetite will be closely monitored.


 Patient enrolled in individual and group therapeutic sessions and encouraged to

attend.


 Patient provided with a safe and structured environment.


 Patient's physical health needs will be addressed by the Hospitalist. 

Hospitalist Consulted


 Labs including CBC, CMP, Lipid profile and Hemoglobin A1C levels ordered for 

baseline reference


 Social Assessment will be completed and the  will work with 

patient and family to ensure a suitable and safe disposition


 Medication adjustment will be made as clinically indicated


    Increased Seroquel 300mg Qhs, 50mg qam


 Usual Wellness Restoration/Preservation:


 - Start Trazodone 50 mg po QHS & 50 mg po QHS PRN between 10 PM & 2 AM for 

insomnia


 - Start Melatonin 5 mg po QHS to promote circadian rhythm


 The patient agreed on the treatment plan, understood the risk, benefit, 

alternative treatment, potential consequence of no treatment, and gave informed 

consent.


 Estimated days: 5


 Post hospital care: primary care provider, psychiatric provider


Case staffed with Dr. Chacon











Medications and Allergies


                                    Allergies











Allergy/AdvReac Type Severity Reaction Status Date / Time


 


Penicillins AdvReac  Hives Verified 08/25/21 22:57


 


succinylcholine AdvReac  Unknown Verified 08/25/21 23:00


 


sulfamethoxazole AdvReac  Hives Verified 08/25/21 23:03











                                Home Medications











 Medication  Instructions  Recorded  Confirmed  Last Taken  Type


 


Apixaban [Eliquis] 5 mg PO BID 08/26/21 08/26/21 Unknown History


 


Atorvastatin [Lipitor] 40 mg PO HS 08/26/21 08/26/21 Unknown History


 


Baclofen [Lioresal] 10 mg PO TID 08/26/21 08/26/21 Unknown History


 


Buspirone HCl [busPIRone] 7.5 mg PO BID 08/26/21 08/26/21 Unknown History


 


Furosemide [Lasix TAB] 20 mg PO DAILY 08/26/21 08/26/21 Unknown History


 


Letrozole (Nf) [Femara (Nf)] 2.5 mg PO DAILY 08/26/21 08/26/21 Unknown History


 


Linaclotide [Linzess] 290 mcg PO DAILY 08/26/21 08/26/21 Unknown History


 


Montelukast Sodium 10 mg PO QPM 08/26/21 08/26/21 Unknown History


 


Ondansetron HCl [Zofran] 8 mg PO Q8HR PRN 08/26/21 08/26/21 Unknown History


 


Pregabalin [Lyrica] 200 mg PO TID 08/26/21 08/26/21 Unknown History


 


QUEtiapine [SEROquel] 250 mg PO HS 08/26/21 08/26/21 Unknown History


 


Ropinirole HCl [rOPINIRole] 6 mg PO BID 08/26/21 08/26/21 Unknown History


 


Venlafaxine HCl [Effexor Xr] 150 mg PO DAILY 08/26/21 08/26/21 Unknown History


 


amLODIPine 5 mg PO DAILY 08/26/21 08/26/21 Unknown History


 


carvediloL [Coreg] 12.5 mg PO BID 08/26/21 08/26/21 Unknown History











Active Meds: 


Active Medications





Amlodipine Besylate (Amlodipine 5 Mg Tab)  5 mg PO DAILY [facility]


   Last Admin: 08/27/21 10:17 Dose:  5 mg


   Documented by: 


Apixaban (Apixaban 5 Mg Tab)  5 mg PO Q12HR [facility]; Protocol


   Last Admin: 08/27/21 21:02 Dose:  5 mg


   Documented by: 


Atorvastatin Calcium (Atorvastatin 40 Mg Tab)  40 mg PO HS [facility]


   Last Admin: 08/27/21 21:02 Dose:  40 mg


   Documented by: 


Baclofen (Baclofen 10 Mg Tab)  10 mg PO TID [facility]


   Last Admin: 08/28/21 08:33 Dose:  10 mg


   Documented by: 


Buspirone HCl (Buspirone 5 Mg Tab)  7.5 mg PO BID [facility]


   Last Admin: 08/27/21 21:01 Dose:  7.5 mg


   Documented by: 


Carvedilol (Carvedilol 12.5 Mg Tab)  12.5 mg PO BID@0800,1700 [facility]


   Last Admin: 08/28/21 08:32 Dose:  12.5 mg


   Documented by: 


Furosemide (Furosemide 20 Mg Tab)  20 mg PO DAILY [facility]


   Last Admin: 08/27/21 10:16 Dose:  20 mg


   Documented by: 


Miscellaneous Medication (Letrozole (Nf))  2.5 mg PO DAILY [facility]


   Last Admin: 08/27/21 10:17 Dose:  2.5 mg


   Documented by: 


Montelukast Sodium (Montelukast 10 Mg Tab)  10 mg PO QPM [facility]


   Last Admin: 08/27/21 18:01 Dose:  10 mg


   Documented by: 


Ondansetron HCl (Ondansetron 4 Mg Odt Tab)  8 mg PO Q8H PRN


   PRN Reason: Nausea And Vomiting


Pregabalin (Pregabalin 50 Mg Cap)  200 mg PO TID [facility]


   Last Admin: 08/28/21 08:34 Dose:  200 mg


   Documented by: 


Quetiapine Fumarate (Quetiapine 200 Mg Tab)  200 mg PO QHS [facility]


   Last Admin: 08/27/21 21:02 Dose:  200 mg


   Documented by: 


Quetiapine Fumarate (Quetiapine 25 Mg Tab)  50 mg PO QHS [facility]


   Last Admin: 08/27/21 21:02 Dose:  50 mg


   Documented by: 


Ropinirole HCl (Ropinirole 1 Mg Tab)  6 mg PO BID [facility]


   Last Admin: 08/27/21 21:00 Dose:  6 mg


   Documented by: 


Venlafaxine HCl (Venlafaxine Xr 75 Mg Cap)  150 mg PO QDAY [facility]


   Last Admin: 08/27/21 10:09 Dose:  150 mg


   Documented by: 


Ziprasidone (Ziprasidone Mesylate 20 Mg Vial)  20 mg IM Q4H PRN


   PRN Reason: Agitation











Results





- Results


Labs/Vitals: 


                             Laboratory Last Values











WBC  5.6 K/mm3 (4.5-11.0)   08/26/21  09:55    


 


RBC  3.62 M/mm3 (3.65-5.03)  L  08/26/21  09:55    


 


Hgb  11.0 gm/dl (10.1-14.3)   08/26/21  09:55    


 


Hct  32.9 % (30.3-42.9)   08/26/21  09:55    


 


MCV  91 fl (79-97)   08/26/21  09:55    


 


MCH  30 pg (28-32)   08/26/21  09:55    


 


MCHC  34 % (30-34)   08/26/21  09:55    


 


RDW  15.1 % (13.2-15.2)   08/26/21  09:55    


 


Plt Count  209 K/mm3 (140-440)   08/26/21  09:55    


 


Lymph % (Auto)  30.3 % (13.4-35.0)   08/26/21  05:59    


 


Mono % (Auto)  9.0 % (0.0-7.3)  H  08/26/21  05:59    


 


Eos % (Auto)  6.0 % (0.0-4.3)  H  08/26/21  05:59    


 


Baso % (Auto)  1.5 % (0.0-1.8)   08/26/21  05:59    


 


Lymph # (Auto)  1.9 K/mm3 (1.2-5.4)   08/26/21  05:59    


 


Mono # (Auto)  0.6 K/mm3 (0.0-0.8)   08/26/21  05:59    


 


Eos # (Auto)  0.4 K/mm3 (0.0-0.4)   08/26/21  05:59    


 


Baso # (Auto)  0.1 K/mm3 (0.0-0.1)   08/26/21  05:59    


 


Seg Neutrophils %  53.2 % (40.0-70.0)   08/26/21  05:59    


 


Seg Neutrophils #  3.3 K/mm3 (1.8-7.7)   08/26/21  05:59    


 


PT  13.8 Sec. (12.2-14.9)   08/26/21  09:55    


 


INR  1.00  (0.87-1.13)   08/26/21  09:55    


 


APTT  27.5 Sec. (24.2-36.6)   08/26/21  09:55    


 


Sodium  135 mmol/L (137-145)  L  08/26/21  05:59    


 


Potassium  3.7 mmol/L (3.6-5.0)   08/26/21  05:59    


 


Chloride  97.3 mmol/L ()  L  08/26/21  05:59    


 


Carbon Dioxide  29 mmol/L (22-30)   08/26/21  05:59    


 


Anion Gap  12 mmol/L  08/26/21  05:59    


 


BUN  11 mg/dL (7-17)   08/26/21  05:59    


 


Creatinine  0.7 mg/dL (0.6-1.2)   08/26/21  09:55    


 


Estimated GFR  > 60 ml/min  08/26/21  09:55    


 


BUN/Creatinine Ratio  18 %  08/26/21  05:59    


 


Glucose  94 mg/dL ()   08/26/21  05:59    


 


Hemoglobin A1c  5.6 % (4-6)   08/26/21  05:59    


 


Calcium  9.0 mg/dL (8.4-10.2)   08/26/21  05:59    


 


Total Bilirubin  0.20 mg/dL (0.1-1.2)   08/26/21  05:59    


 


AST  24 units/L (5-40)   08/26/21  05:59    


 


ALT  22 units/L (7-56)   08/26/21  05:59    


 


Alkaline Phosphatase  111 units/L ()   08/26/21  05:59    


 


Total Protein  6.9 g/dL (6.3-8.2)   08/26/21  05:59    


 


Albumin  3.3 g/dL (3.9-5)  L  08/26/21  05:59    


 


Albumin/Globulin Ratio  0.9 %  08/26/21  05:59    


 


Triglycerides  277 mg/dL (2-149)  H  08/26/21  05:59    


 


Cholesterol  162 mg/dL ()   08/26/21  05:59    


 


LDL Cholesterol Direct  103 mg/dL ()   08/26/21  05:59    


 


HDL Cholesterol  29 mg/dL (40-59)  L  08/26/21  05:59    


 


Cholesterol/HDL Ratio  5.58 %  08/26/21  05:59    








                                Last Vital Signs











Temp  98.9 F   08/27/21 19:35


 


Pulse  73   08/28/21 08:32


 


Resp  20   08/27/21 19:35


 


BP  105/51   08/28/21 08:32


 


Pulse Ox  97   08/27/21 19:35

## 2021-08-28 NOTE — EVENT NOTE
Date: 08/28/21


Attempt to speak with the patent's son, John Deluca. He did not . His 

mailbox was full so a message could not be left. Will try to reach the patient  

later today or tomorrow.

## 2021-08-28 NOTE — EVENT NOTE
Date: 08/28/21





Paged about shoulder pain for patient. Will order ibuprofen prn. Patient states 

that she takes lyrica for pain written by her doctor. Verified on [place] PDMP. 

Cleo gets lyrica 200 mg po tid from her OP neurologist Dr. Timoteo Rodriguez. Will 

order

## 2021-08-29 LAB
HCT VFR BLD CALC: 34.4 % (ref 30.3–42.9)
HGB BLD-MCNC: 11.3 GM/DL (ref 10.1–14.3)
MCHC RBC AUTO-ENTMCNC: 33 % (ref 30–34)
MCV RBC AUTO: 92 FL (ref 79–97)
PLATELET # BLD: 256 K/MM3 (ref 140–440)
RBC # BLD AUTO: 3.76 M/MM3 (ref 3.65–5.03)

## 2021-08-29 RX ADMIN — PREGABALIN SCH MG: 50 CAPSULE ORAL at 14:01

## 2021-08-29 RX ADMIN — APIXABAN SCH MG: 5 TABLET, FILM COATED ORAL at 09:03

## 2021-08-29 RX ADMIN — FUROSEMIDE SCH MG: 20 TABLET ORAL at 09:03

## 2021-08-29 RX ADMIN — PREGABALIN SCH MG: 50 CAPSULE ORAL at 08:45

## 2021-08-29 RX ADMIN — IBUPROFEN PRN MG: 400 TABLET, FILM COATED ORAL at 12:23

## 2021-08-29 RX ADMIN — PREGABALIN SCH MG: 50 CAPSULE ORAL at 20:39

## 2021-08-29 RX ADMIN — BACLOFEN SCH MG: 10 TABLET ORAL at 08:44

## 2021-08-29 RX ADMIN — BACLOFEN SCH MG: 10 TABLET ORAL at 14:01

## 2021-08-29 RX ADMIN — LINACLOTIDE SCH MCG: 290 CAPSULE, GELATIN COATED ORAL at 09:04

## 2021-08-29 RX ADMIN — APIXABAN SCH MG: 5 TABLET, FILM COATED ORAL at 21:13

## 2021-08-29 RX ADMIN — BACLOFEN SCH MG: 10 TABLET ORAL at 20:39

## 2021-08-29 RX ADMIN — VENLAFAXINE HYDROCHLORIDE SCH MG: 75 CAPSULE, EXTENDED RELEASE ORAL at 09:02

## 2021-08-29 RX ADMIN — MONTELUKAST SCH MG: 10 TABLET, FILM COATED ORAL at 17:15

## 2021-08-29 NOTE — PROGRESS NOTE
Subjective


Date of service: 08/29/21


Principal diagnosis: Psychosis


Subjective Comment: 


The patient was seen today. She is asleep but easily arouses. She is still 

appears delusional and having flight of ideas. She is talking about her daddy 

thinks she is aggressive. She then starts again talking about spiders, and 

rambling about her daughter. She denies SI/HI or hallucinations. She says she 

slept "pretty good."





REVIEW OF SYSTEMS  


Constitutional: Negative for weight loss


ENT: Negative for stridor


Respiratory: Negative for cough or hemoptysis


All other systems reviewed and are negative





MENTAL STATUS EXAMINATION


General Appearance and Behavior: Age appropriate, good hygiene, wearing 

appropriate clothes.


Cooperation: Cooperative


Psychomotor Behavior: Psychomotor normal


Mood: fine


Affect and affective range: Incongruent with stated mood


Thought Process: Confused/ circumstantial


Thought Content: delusions


Speech: Normal volume, Regular rate and rhythm, 


Suicidal Ideation: Denies


Homicidal Ideation: Denies


Hallucinations: Denies


Delusions: Yes


Impulse Control: Questionable


Insight and Judgment: Limited, poor judgment


Memory: Abnormal


Attention: Distractible


Orientation: alert and oriented








 Assessment and Plan 


(1)Psychosis NOS


Current Visit: Yes   Status: Acute





Treatment Plan


 Patient admitted for inpatient psychiatric evaluation, medication adjustment 

and close monitoring


 The patient's behavior, mood, sleep and appetite will be closely monitored.


 Patient enrolled in individual and group therapeutic sessions and encouraged to

attend.


 Patient provided with a safe and structured environment.


 Patient's physical health needs will be addressed by the Hospitalist. 

Hospitalist Consulted


 Labs including CBC, CMP, Lipid profile and Hemoglobin A1C levels ordered for 

baseline reference


 Social Assessment will be completed and the  will work with 

patient and family to ensure a suitable and safe disposition


 Medication adjustment will be made as clinically indicated


    Increased Seroquel 300mg Qhs, 50mg qam yesterday


 Usual Wellness Restoration/Preservation:


 - Start Trazodone 50 mg po QHS & 50 mg po QHS PRN between 10 PM & 2 AM for 

insomnia


 - Start Melatonin 5 mg po QHS to promote circadian rhythm


 The patient agreed on the treatment plan, understood the risk, benefit, 

alternative treatment, potential consequence of no treatment, and gave informed 

consent.


 Estimated days: 5


 Post hospital care: primary care provider, psychiatric provider


Case staffed with Dr. Chacon











Medications and Allergies


                                    Allergies











Allergy/AdvReac Type Severity Reaction Status Date / Time


 


Penicillins AdvReac  Hives Verified 08/25/21 22:57


 


succinylcholine AdvReac  Unknown Verified 08/25/21 23:00


 


sulfamethoxazole AdvReac  Hives Verified 08/25/21 23:03











                                Home Medications











 Medication  Instructions  Recorded  Confirmed  Last Taken  Type


 


Apixaban [Eliquis] 5 mg PO BID 08/26/21 08/26/21 Unknown History


 


Atorvastatin [Lipitor] 40 mg PO HS 08/26/21 08/26/21 Unknown History


 


Baclofen [Lioresal] 10 mg PO TID 08/26/21 08/26/21 Unknown History


 


Buspirone HCl [busPIRone] 7.5 mg PO BID 08/26/21 08/26/21 Unknown History


 


Furosemide [Lasix TAB] 20 mg PO DAILY 08/26/21 08/26/21 Unknown History


 


Letrozole (Nf) [Femara (Nf)] 2.5 mg PO DAILY 08/26/21 08/26/21 Unknown History


 


Linaclotide [Linzess] 290 mcg PO DAILY 08/26/21 08/26/21 Unknown History


 


Montelukast Sodium 10 mg PO QPM 08/26/21 08/26/21 Unknown History


 


Ondansetron HCl [Zofran] 8 mg PO Q8HR PRN 08/26/21 08/26/21 Unknown History


 


Pregabalin [Lyrica] 200 mg PO TID 08/26/21 08/26/21 Unknown History


 


QUEtiapine [SEROquel] 250 mg PO HS 08/26/21 08/26/21 Unknown History


 


Ropinirole HCl [rOPINIRole] 6 mg PO BID 08/26/21 08/26/21 Unknown History


 


Venlafaxine HCl [Effexor Xr] 150 mg PO DAILY 08/26/21 08/26/21 Unknown History


 


amLODIPine 5 mg PO DAILY 08/26/21 08/26/21 Unknown History


 


carvediloL [Coreg] 12.5 mg PO BID 08/26/21 08/26/21 Unknown History


 


Amoxicillin/Potassium Clav 1 tab PO Q12HR 08/28/21 08/28/21 Unknown History





[Augmentin 875-125 Tablet]     











Active Meds: 


Active Medications





Amlodipine Besylate (Amlodipine 5 Mg Tab)  5 mg PO DAILY [facility]


   Last Admin: 08/29/21 09:03 Dose:  5 mg


   Documented by: 


Apixaban (Apixaban 5 Mg Tab)  5 mg PO Q12HR [facility]; Protocol


   Last Admin: 08/29/21 09:03 Dose:  5 mg


   Documented by: 


Atorvastatin Calcium (Atorvastatin 40 Mg Tab)  40 mg PO HS [facility]


   Last Admin: 08/28/21 21:09 Dose:  40 mg


   Documented by: 


Baclofen (Baclofen 10 Mg Tab)  10 mg PO TID [facility]


   Last Admin: 08/29/21 08:44 Dose:  10 mg


   Documented by: 


Buspirone HCl (Buspirone 5 Mg Tab)  7.5 mg PO BID [facility]


   Last Admin: 08/29/21 09:02 Dose:  7.5 mg


   Documented by: 


Carvedilol (Carvedilol 12.5 Mg Tab)  12.5 mg PO BID@0800,1700 [facility]


   Last Admin: 08/29/21 08:44 Dose:  12.5 mg


   Documented by: 


Furosemide (Furosemide 20 Mg Tab)  20 mg PO DAILY [facility]


   Last Admin: 08/29/21 09:03 Dose:  20 mg


   Documented by: 


Ibuprofen (Ibuprofen 400 Mg Tab)  400 mg PO Q6H PRN


   PRN Reason: Pain, Mild (1-3)


Miscellaneous Medication (Letrozole (Nf))  2.5 mg PO DAILY [facility]


   Last Admin: 08/29/21 09:04 Dose:  2.5 mg


   Documented by: 


Montelukast Sodium (Montelukast 10 Mg Tab)  10 mg PO QPM [facility]


   Last Admin: 08/28/21 17:01 Dose:  10 mg


   Documented by: 


Ondansetron HCl (Ondansetron 4 Mg Odt Tab)  8 mg PO Q8H PRN


   PRN Reason: Nausea And Vomiting


Pregabalin (Pregabalin 50 Mg Cap)  200 mg PO TID [facility]


   Last Admin: 08/29/21 08:45 Dose:  200 mg


   Documented by: 


Quetiapine Fumarate (Quetiapine 100 Mg Tab)  300 mg PO QHS [facility]


   Last Admin: 08/28/21 21:09 Dose:  300 mg


   Documented by: 


Quetiapine Fumarate (Quetiapine 25 Mg Tab)  50 mg PO DAILY [facility]


   Last Admin: 08/29/21 09:03 Dose:  50 mg


   Documented by: 


Ropinirole HCl (Ropinirole 1 Mg Tab)  6 mg PO BID [facility]


   Last Admin: 08/29/21 09:05 Dose:  6 mg


   Documented by: 


Venlafaxine HCl (Venlafaxine Xr 75 Mg Cap)  150 mg PO QDAY [facility]


   Last Admin: 08/29/21 09:02 Dose:  150 mg


   Documented by: 


Ziprasidone (Ziprasidone Mesylate 20 Mg Vial)  20 mg IM Q4H PRN


   PRN Reason: Agitation











Results





- Results


Labs/Vitals: 


                             Laboratory Last Values











WBC  5.6 K/mm3 (4.5-11.0)   08/26/21  09:55    


 


RBC  3.62 M/mm3 (3.65-5.03)  L  08/26/21  09:55    


 


Hgb  11.0 gm/dl (10.1-14.3)   08/26/21  09:55    


 


Hct  32.9 % (30.3-42.9)   08/26/21  09:55    


 


MCV  91 fl (79-97)   08/26/21  09:55    


 


MCH  30 pg (28-32)   08/26/21  09:55    


 


MCHC  34 % (30-34)   08/26/21  09:55    


 


RDW  15.1 % (13.2-15.2)   08/26/21  09:55    


 


Plt Count  209 K/mm3 (140-440)   08/26/21  09:55    


 


Lymph % (Auto)  30.3 % (13.4-35.0)   08/26/21  05:59    


 


Mono % (Auto)  9.0 % (0.0-7.3)  H  08/26/21  05:59    


 


Eos % (Auto)  6.0 % (0.0-4.3)  H  08/26/21  05:59    


 


Baso % (Auto)  1.5 % (0.0-1.8)   08/26/21  05:59    


 


Lymph # (Auto)  1.9 K/mm3 (1.2-5.4)   08/26/21  05:59    


 


Mono # (Auto)  0.6 K/mm3 (0.0-0.8)   08/26/21  05:59    


 


Eos # (Auto)  0.4 K/mm3 (0.0-0.4)   08/26/21  05:59    


 


Baso # (Auto)  0.1 K/mm3 (0.0-0.1)   08/26/21  05:59    


 


Seg Neutrophils %  53.2 % (40.0-70.0)   08/26/21  05:59    


 


Seg Neutrophils #  3.3 K/mm3 (1.8-7.7)   08/26/21  05:59    


 


PT  13.8 Sec. (12.2-14.9)   08/26/21  09:55    


 


INR  1.00  (0.87-1.13)   08/26/21  09:55    


 


APTT  27.5 Sec. (24.2-36.6)   08/26/21  09:55    


 


Sodium  135 mmol/L (137-145)  L  08/26/21  05:59    


 


Potassium  3.7 mmol/L (3.6-5.0)   08/26/21  05:59    


 


Chloride  97.3 mmol/L ()  L  08/26/21  05:59    


 


Carbon Dioxide  29 mmol/L (22-30)   08/26/21  05:59    


 


Anion Gap  12 mmol/L  08/26/21  05:59    


 


BUN  11 mg/dL (7-17)   08/26/21  05:59    


 


Creatinine  0.7 mg/dL (0.6-1.2)   08/26/21  09:55    


 


Estimated GFR  > 60 ml/min  08/26/21  09:55    


 


BUN/Creatinine Ratio  18 %  08/26/21  05:59    


 


Glucose  94 mg/dL ()   08/26/21  05:59    


 


Hemoglobin A1c  5.6 % (4-6)   08/26/21  05:59    


 


Calcium  9.0 mg/dL (8.4-10.2)   08/26/21  05:59    


 


Total Bilirubin  0.20 mg/dL (0.1-1.2)   08/26/21  05:59    


 


AST  24 units/L (5-40)   08/26/21  05:59    


 


ALT  22 units/L (7-56)   08/26/21  05:59    


 


Alkaline Phosphatase  111 units/L ()   08/26/21  05:59    


 


Total Protein  6.9 g/dL (6.3-8.2)   08/26/21  05:59    


 


Albumin  3.3 g/dL (3.9-5)  L  08/26/21  05:59    


 


Albumin/Globulin Ratio  0.9 %  08/26/21  05:59    


 


Triglycerides  277 mg/dL (2-149)  H  08/26/21  05:59    


 


Cholesterol  162 mg/dL ()   08/26/21  05:59    


 


LDL Cholesterol Direct  103 mg/dL ()   08/26/21  05:59    


 


HDL Cholesterol  29 mg/dL (40-59)  L  08/26/21  05:59    


 


Cholesterol/HDL Ratio  5.58 %  08/26/21  05:59    








                                Last Vital Signs











Temp  98.5 F   08/28/21 19:18


 


Pulse  96 H  08/29/21 09:03


 


Resp  18   08/28/21 19:18


 


BP  117/68   08/29/21 09:03


 


Pulse Ox  95   08/28/21 19:18

## 2021-08-30 LAB
HCT VFR BLD CALC: 33 % (ref 30.3–42.9)
HGB BLD-MCNC: 11 GM/DL (ref 10.1–14.3)
MCHC RBC AUTO-ENTMCNC: 33 % (ref 30–34)
MCV RBC AUTO: 91 FL (ref 79–97)
PLATELET # BLD: 215 K/MM3 (ref 140–440)
RBC # BLD AUTO: 3.62 M/MM3 (ref 3.65–5.03)

## 2021-08-30 RX ADMIN — PREGABALIN SCH MG: 50 CAPSULE ORAL at 14:03

## 2021-08-30 RX ADMIN — VENLAFAXINE HYDROCHLORIDE SCH MG: 75 CAPSULE, EXTENDED RELEASE ORAL at 10:20

## 2021-08-30 RX ADMIN — BACLOFEN SCH MG: 10 TABLET ORAL at 10:20

## 2021-08-30 RX ADMIN — PREGABALIN SCH MG: 50 CAPSULE ORAL at 10:20

## 2021-08-30 RX ADMIN — LINACLOTIDE SCH MCG: 290 CAPSULE, GELATIN COATED ORAL at 10:35

## 2021-08-30 RX ADMIN — IBUPROFEN PRN MG: 400 TABLET, FILM COATED ORAL at 22:08

## 2021-08-30 RX ADMIN — BACLOFEN SCH MG: 10 TABLET ORAL at 21:27

## 2021-08-30 RX ADMIN — BACLOFEN SCH MG: 10 TABLET ORAL at 14:04

## 2021-08-30 RX ADMIN — PREGABALIN SCH MG: 50 CAPSULE ORAL at 21:25

## 2021-08-30 RX ADMIN — APIXABAN SCH MG: 5 TABLET, FILM COATED ORAL at 21:27

## 2021-08-30 RX ADMIN — MONTELUKAST SCH MG: 10 TABLET, FILM COATED ORAL at 18:15

## 2021-08-30 RX ADMIN — APIXABAN SCH MG: 5 TABLET, FILM COATED ORAL at 10:31

## 2021-08-30 RX ADMIN — FUROSEMIDE SCH MG: 20 TABLET ORAL at 10:20

## 2021-08-30 NOTE — PROGRESS NOTE
Assessment and Plan


Assessment and plan: 


Acute psychosis with hallucinations


Bipolar disorder


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 

94% on 3 L nasal cannula


Hypertension


Hyperlipidemia - , Total Chol 162


History of Pulmonary embolism x3


History of metastatic breast cancer


History of Ovarian Cancer


Anxiety/Depression





Plan:


-Continue all home medication


Monitor QTC segment as patient is on multiple prolonging agents.  Psychiatry 

medication adjustments noted


Monitor for signs and symptoms of bleeding as patient is on anticoagulation


- monitor BP, I/O's, cautious use of fluids in light of CHF


- supplemental oxygen


- psychiatry illness/medication management per psych team


We will continue to follow along








History


Interval history: 





No acute overnight events.





Hospitalist Physical





- Physical exam


Narrative exam: 





Physical Exam:


Constitutional: Alert, cooperative. No acute distress


Head, Ears, Nose: Normocephalic, atraumatic. External ears, nose normal


Eyes: Conjunctivae/corneas clear. No icterus. No ptosis.


Neck: Supple, no meningeal signs


Oral: dentition fair, no thrush


Cardiovascular: S1, S2 normal. 


Respiratory: Poor air movement. Requiring 3 L nasal cannula


GI: Soft, non-tender; bowel sounds normal. No peritoneal signs. 


Musculoskeletal: No pedal edema, no cyanosis.


Skin: No rash or abscess, see nursing assessment for full skin exam


Hem/Lymphatic: No palpable cervical or supraclavicular nodes. No lymphangitis


Psych: Alert and oriented x3 however patient is severely paranoid and anxious


Neurological: Awake, alert, oriented. No gross abnormality





- Constitutional


Vitals: 


                                        











Temp Pulse Resp BP Pulse Ox


 


 98.7 F   89   20   101/57   98 


 


 08/30/21 09:06  08/30/21 10:31  08/30/21 09:06  08/30/21 10:31  08/30/21 09:06














Results





- Labs


CBC & Chem 7: 


                                 08/30/21 06:05





                                 08/29/21 14:07


Labs: 


                             Laboratory Last Values











WBC  6.3 K/mm3 (4.5-11.0)   08/30/21  06:05    


 


RBC  3.62 M/mm3 (3.65-5.03)  L  08/30/21  06:05    


 


Hgb  11.0 gm/dl (10.1-14.3)   08/30/21  06:05    


 


Hct  33.0 % (30.3-42.9)   08/30/21  06:05    


 


MCV  91 fl (79-97)   08/30/21  06:05    


 


MCH  30 pg (28-32)   08/30/21  06:05    


 


MCHC  33 % (30-34)   08/30/21  06:05    


 


RDW  15.2 % (13.2-15.2)   08/30/21  06:05    


 


Plt Count  215 K/mm3 (140-440)   08/30/21  06:05    


 


Lymph % (Auto)  30.3 % (13.4-35.0)   08/26/21  05:59    


 


Mono % (Auto)  9.0 % (0.0-7.3)  H  08/26/21  05:59    


 


Eos % (Auto)  6.0 % (0.0-4.3)  H  08/26/21  05:59    


 


Baso % (Auto)  1.5 % (0.0-1.8)   08/26/21  05:59    


 


Lymph # (Auto)  1.9 K/mm3 (1.2-5.4)   08/26/21  05:59    


 


Mono # (Auto)  0.6 K/mm3 (0.0-0.8)   08/26/21  05:59    


 


Eos # (Auto)  0.4 K/mm3 (0.0-0.4)   08/26/21  05:59    


 


Baso # (Auto)  0.1 K/mm3 (0.0-0.1)   08/26/21  05:59    


 


Seg Neutrophils %  53.2 % (40.0-70.0)   08/26/21  05:59    


 


Seg Neutrophils #  3.3 K/mm3 (1.8-7.7)   08/26/21  05:59    


 


PT  13.8 Sec. (12.2-14.9)   08/26/21  09:55    


 


INR  1.00  (0.87-1.13)   08/26/21  09:55    


 


APTT  27.5 Sec. (24.2-36.6)   08/26/21  09:55    


 


Sodium  135 mmol/L (137-145)  L  08/26/21  05:59    


 


Potassium  3.7 mmol/L (3.6-5.0)   08/26/21  05:59    


 


Chloride  97.3 mmol/L ()  L  08/26/21  05:59    


 


Carbon Dioxide  29 mmol/L (22-30)   08/26/21  05:59    


 


Anion Gap  12 mmol/L  08/26/21  05:59    


 


BUN  11 mg/dL (7-17)   08/26/21  05:59    


 


Creatinine  0.7 mg/dL (0.6-1.2)   08/29/21  14:07    


 


Estimated GFR  > 60 ml/min  08/29/21  14:07    


 


BUN/Creatinine Ratio  18 %  08/26/21  05:59    


 


Glucose  94 mg/dL ()   08/26/21  05:59    


 


Hemoglobin A1c  5.6 % (4-6)   08/26/21  05:59    


 


Calcium  9.0 mg/dL (8.4-10.2)   08/26/21  05:59    


 


Total Bilirubin  0.20 mg/dL (0.1-1.2)   08/26/21  05:59    


 


AST  24 units/L (5-40)   08/26/21  05:59    


 


ALT  22 units/L (7-56)   08/26/21  05:59    


 


Alkaline Phosphatase  111 units/L ()   08/26/21  05:59    


 


Total Protein  6.9 g/dL (6.3-8.2)   08/26/21  05:59    


 


Albumin  3.3 g/dL (3.9-5)  L  08/26/21  05:59    


 


Albumin/Globulin Ratio  0.9 %  08/26/21  05:59    


 


Triglycerides  277 mg/dL (2-149)  H  08/26/21  05:59    


 


Cholesterol  162 mg/dL ()   08/26/21  05:59    


 


LDL Cholesterol Direct  103 mg/dL ()   08/26/21  05:59    


 


HDL Cholesterol  29 mg/dL (40-59)  L  08/26/21  05:59    


 


Cholesterol/HDL Ratio  5.58 %  08/26/21  05:59    











Pond/IV: 


                                        





Voiding Method                   Toilet











Active Medications





- Current Medications


Current Medications: 














Generic Name Dose Route Start Last Admin





  Trade Name Freq  PRN Reason Stop Dose Admin


 


Amlodipine Besylate  5 mg  08/26/21 10:00  08/30/21 10:26





  Amlodipine 5 Mg Tab  PO   Not Given





  DAILY [facility]  


 


Apixaban  5 mg  08/26/21 10:00  08/30/21 10:31





  Apixaban 5 Mg Tab  PO   5 mg





  Q12HR [facility]   Administration





  Protocol  


 


Atorvastatin Calcium  40 mg  08/26/21 22:00  08/29/21 21:13





  Atorvastatin 40 Mg Tab  PO   40 mg





  HS [facility]   Administration


 


Baclofen  10 mg  08/26/21 08:00  08/30/21 14:04





  Baclofen 10 Mg Tab  PO   10 mg





  TID [facility]   Administration


 


Buspirone HCl  7.5 mg  08/26/21 10:00  08/30/21 10:22





  Buspirone 5 Mg Tab  PO   7.5 mg





  BID [facility]   Administration


 


Carvedilol  12.5 mg  08/26/21 03:00  08/30/21 10:31





  Carvedilol 12.5 Mg Tab  PO   12.5 mg





  BID@0800,1700 [facility]   Administration


 


Furosemide  20 mg  08/26/21 10:00  08/30/21 10:20





  Furosemide 20 Mg Tab  PO   20 mg





  DAILY [facility]   Administration


 


Ibuprofen  400 mg  08/28/21 12:37  08/29/21 12:23





  Ibuprofen 400 Mg Tab  PO   400 mg





  Q6H PRN   Administration





  Pain, Mild (1-3)  


 


Miscellaneous Medication  2.5 mg  08/27/21 10:00  08/30/21 10:35





  Letrozole (Nf)  PO   2.5 mg





  DAILY [facility]   Administration


 


Montelukast Sodium  10 mg  08/26/21 18:00  08/29/21 17:15





  Montelukast 10 Mg Tab  PO   10 mg





  QPM [facility]   Administration


 


Ondansetron HCl  8 mg  08/26/21 10:00 





  Ondansetron 4 Mg Odt Tab  PO  





  Q8H PRN  





  Nausea And Vomiting  


 


Pregabalin  200 mg  08/26/21 10:00  08/30/21 14:03





  Pregabalin 50 Mg Cap  PO   200 mg





  TID [facility]   Administration


 


Quetiapine Fumarate  300 mg  08/28/21 22:00  08/29/21 21:13





  Quetiapine 100 Mg Tab  PO   300 mg





  QHS [facility]   Administration


 


Quetiapine Fumarate  50 mg  08/28/21 10:00  08/30/21 10:21





  Quetiapine 25 Mg Tab  PO   50 mg





  DAILY [facility]   Administration


 


Ropinirole HCl  6 mg  08/26/21 02:50  08/30/21 10:21





  Ropinirole 1 Mg Tab  PO   6 mg





  BID [facility]   Administration


 


Venlafaxine HCl  150 mg  08/26/21 10:00  08/30/21 10:20





  Venlafaxine Xr 75 Mg Cap  PO   150 mg





  QDAY [facility]   Administration


 


Ziprasidone  20 mg  08/26/21 09:00 





  Ziprasidone Mesylate 20 Mg Vial  IM  





  Q4H PRN  





  Agitation

## 2021-08-30 NOTE — PROGRESS NOTE
Subjective


Date of service: 08/30/21


Principal diagnosis: Psychosis


Subjective Comment: 


The patient was seen today. She is talkative and pleasant. She denies SI/HI or 

hallucinations of any kind. She does have flight of ideas. She says her daughter

and her father had her placed here because everything she looked at looked like 

spiders. She laughs and says "that was then."





Reason for continued inpatient treatment: The patient appears to be improving. 

Will continue to treat and plan for a safe discharge once outpatient resources 

are set up to ensue continuity of mental wellness. 





REVIEW OF SYSTEMS  


Constitutional: Negative for weight loss


ENT: Negative for stridor


Respiratory: Negative for cough or hemoptysis


All other systems reviewed and are negative





MENTAL STATUS EXAMINATION


General Appearance and Behavior: Age appropriate, good hygiene, wearing 

appropriate clothes.


Cooperation: Cooperative


Psychomotor Behavior: Psychomotor normal


Mood: fine


Affect and affective range: Incongruent with stated mood


Thought Process: Confused/ circumstantial


Thought Content: delusions


Speech: Normal volume, Regular rate and rhythm, 


Suicidal Ideation: Denies


Homicidal Ideation: Denies


Hallucinations: Denies


Delusions: Yes


Impulse Control: Questionable


Insight and Judgment: Limited, poor judgment


Memory: Abnormal


Attention: Distractible


Orientation: alert and oriented








 Assessment and Plan 


(1)Psychosis NOS


Current Visit: Yes   Status: Acute





Treatment Plan


 Patient admitted for inpatient psychiatric evaluation, medication adjustment 

and close monitoring


 The patient's behavior, mood, sleep and appetite will be closely monitored.


 Patient enrolled in individual and group therapeutic sessions and encouraged to

attend.


 Patient provided with a safe and structured environment.


 Patient's physical health needs will be addressed by the Hospitalist. 

Hospitalist Consulted


 Labs including CBC, CMP, Lipid profile and Hemoglobin A1C levels ordered for 

baseline reference


 Social Assessment will be completed and the  will work with 

patient and family to ensure a suitable and safe disposition


 Medication adjustment will be made as clinically indicated


    Continue medications


 Usual Wellness Restoration/Preservation:


 - Start Trazodone 50 mg po QHS & 50 mg po QHS PRN between 10 PM & 2 AM for 

insomnia


 - Start Melatonin 5 mg po QHS to promote circadian rhythm


 The patient agreed on the treatment plan, understood the risk, benefit, 

alternative treatment, potential consequence of no treatment, and gave informed 

consent.


 Estimated days: 5


 Post hospital care: primary care provider, psychiatric provider


Case staffed with Dr. Chacon











Medications and Allergies


                                    Allergies











Allergy/AdvReac Type Severity Reaction Status Date / Time


 


Penicillins AdvReac  Hives Verified 08/25/21 22:57


 


succinylcholine AdvReac  Unknown Verified 08/25/21 23:00


 


sulfamethoxazole AdvReac  Hives Verified 08/25/21 23:03











                                Home Medications











 Medication  Instructions  Recorded  Confirmed  Last Taken  Type


 


Apixaban [Eliquis] 5 mg PO BID 08/26/21 08/26/21 Unknown History


 


Atorvastatin [Lipitor] 40 mg PO HS 08/26/21 08/26/21 Unknown History


 


Baclofen [Lioresal] 10 mg PO TID 08/26/21 08/26/21 Unknown History


 


Buspirone HCl [busPIRone] 7.5 mg PO BID 08/26/21 08/26/21 Unknown History


 


Furosemide [Lasix TAB] 20 mg PO DAILY 08/26/21 08/26/21 Unknown History


 


Letrozole (Nf) [Femara (Nf)] 2.5 mg PO DAILY 08/26/21 08/26/21 Unknown History


 


Linaclotide [Linzess] 290 mcg PO DAILY 08/26/21 08/26/21 Unknown History


 


Montelukast Sodium 10 mg PO QPM 08/26/21 08/26/21 Unknown History


 


Ondansetron HCl [Zofran] 8 mg PO Q8HR PRN 08/26/21 08/26/21 Unknown History


 


Pregabalin [Lyrica] 200 mg PO TID 08/26/21 08/26/21 Unknown History


 


QUEtiapine [SEROquel] 250 mg PO HS 08/26/21 08/26/21 Unknown History


 


Ropinirole HCl [rOPINIRole] 6 mg PO BID 08/26/21 08/26/21 Unknown History


 


Venlafaxine HCl [Effexor Xr] 150 mg PO DAILY 08/26/21 08/26/21 Unknown History


 


amLODIPine 5 mg PO DAILY 08/26/21 08/26/21 Unknown History


 


carvediloL [Coreg] 12.5 mg PO BID 08/26/21 08/26/21 Unknown History


 


Amoxicillin/Potassium Clav 1 tab PO Q12HR 08/28/21 08/28/21 Unknown History





[Augmentin 875-125 Tablet]     











Active Meds: 


Active Medications





Amlodipine Besylate (Amlodipine 5 Mg Tab)  5 mg PO DAILY [facility]


   Last Admin: 08/29/21 09:03 Dose:  5 mg


   Documented by: 


Apixaban (Apixaban 5 Mg Tab)  5 mg PO Q12HR [facility]; Protocol


   Last Admin: 08/29/21 21:13 Dose:  5 mg


   Documented by: 


Atorvastatin Calcium (Atorvastatin 40 Mg Tab)  40 mg PO HS [facility]


   Last Admin: 08/29/21 21:13 Dose:  40 mg


   Documented by: 


Baclofen (Baclofen 10 Mg Tab)  10 mg PO TID [facility]


   Last Admin: 08/29/21 20:39 Dose:  10 mg


   Documented by: 


Buspirone HCl (Buspirone 5 Mg Tab)  7.5 mg PO BID [facility]


   Last Admin: 08/29/21 21:13 Dose:  7.5 mg


   Documented by: 


Carvedilol (Carvedilol 12.5 Mg Tab)  12.5 mg PO BID@0800,1700 [facility]


   Last Admin: 08/29/21 17:15 Dose:  12.5 mg


   Documented by: 


Furosemide (Furosemide 20 Mg Tab)  20 mg PO DAILY [facility]


   Last Admin: 08/29/21 09:03 Dose:  20 mg


   Documented by: 


Ibuprofen (Ibuprofen 400 Mg Tab)  400 mg PO Q6H PRN


   PRN Reason: Pain, Mild (1-3)


   Last Admin: 08/29/21 12:23 Dose:  400 mg


   Documented by: 


Miscellaneous Medication (Letrozole (Nf))  2.5 mg PO DAILY [facility]


   Last Admin: 08/29/21 09:04 Dose:  2.5 mg


   Documented by: 


Montelukast Sodium (Montelukast 10 Mg Tab)  10 mg PO QPM [facility]


   Last Admin: 08/29/21 17:15 Dose:  10 mg


   Documented by: 


Ondansetron HCl (Ondansetron 4 Mg Odt Tab)  8 mg PO Q8H PRN


   PRN Reason: Nausea And Vomiting


Pregabalin (Pregabalin 50 Mg Cap)  200 mg PO TID [facility]


   Last Admin: 08/29/21 20:39 Dose:  200 mg


   Documented by: 


Quetiapine Fumarate (Quetiapine 100 Mg Tab)  300 mg PO QHS [facility]


   Last Admin: 08/29/21 21:13 Dose:  300 mg


   Documented by: 


Quetiapine Fumarate (Quetiapine 25 Mg Tab)  50 mg PO DAILY [facility]


   Last Admin: 08/29/21 09:03 Dose:  50 mg


   Documented by: 


Ropinirole HCl (Ropinirole 1 Mg Tab)  6 mg PO BID [facility]


   Last Admin: 08/29/21 21:14 Dose:  6 mg


   Documented by: 


Venlafaxine HCl (Venlafaxine Xr 75 Mg Cap)  150 mg PO QDAY [facility]


   Last Admin: 08/29/21 09:02 Dose:  150 mg


   Documented by: 


Ziprasidone (Ziprasidone Mesylate 20 Mg Vial)  20 mg IM Q4H PRN


   PRN Reason: Agitation











Results





- Results


Labs/Vitals: 


                             Laboratory Last Values











WBC  6.3 K/mm3 (4.5-11.0)   08/30/21  06:05    


 


RBC  3.62 M/mm3 (3.65-5.03)  L  08/30/21  06:05    


 


Hgb  11.0 gm/dl (10.1-14.3)   08/30/21  06:05    


 


Hct  33.0 % (30.3-42.9)   08/30/21  06:05    


 


MCV  91 fl (79-97)   08/30/21  06:05    


 


MCH  30 pg (28-32)   08/30/21  06:05    


 


MCHC  33 % (30-34)   08/30/21  06:05    


 


RDW  15.2 % (13.2-15.2)   08/30/21  06:05    


 


Plt Count  215 K/mm3 (140-440)   08/30/21  06:05    


 


Lymph % (Auto)  30.3 % (13.4-35.0)   08/26/21  05:59    


 


Mono % (Auto)  9.0 % (0.0-7.3)  H  08/26/21  05:59    


 


Eos % (Auto)  6.0 % (0.0-4.3)  H  08/26/21  05:59    


 


Baso % (Auto)  1.5 % (0.0-1.8)   08/26/21  05:59    


 


Lymph # (Auto)  1.9 K/mm3 (1.2-5.4)   08/26/21  05:59    


 


Mono # (Auto)  0.6 K/mm3 (0.0-0.8)   08/26/21  05:59    


 


Eos # (Auto)  0.4 K/mm3 (0.0-0.4)   08/26/21  05:59    


 


Baso # (Auto)  0.1 K/mm3 (0.0-0.1)   08/26/21  05:59    


 


Seg Neutrophils %  53.2 % (40.0-70.0)   08/26/21  05:59    


 


Seg Neutrophils #  3.3 K/mm3 (1.8-7.7)   08/26/21  05:59    


 


PT  13.8 Sec. (12.2-14.9)   08/26/21  09:55    


 


INR  1.00  (0.87-1.13)   08/26/21  09:55    


 


APTT  27.5 Sec. (24.2-36.6)   08/26/21  09:55    


 


Sodium  135 mmol/L (137-145)  L  08/26/21  05:59    


 


Potassium  3.7 mmol/L (3.6-5.0)   08/26/21  05:59    


 


Chloride  97.3 mmol/L ()  L  08/26/21  05:59    


 


Carbon Dioxide  29 mmol/L (22-30)   08/26/21  05:59    


 


Anion Gap  12 mmol/L  08/26/21  05:59    


 


BUN  11 mg/dL (7-17)   08/26/21  05:59    


 


Creatinine  0.7 mg/dL (0.6-1.2)   08/29/21  14:07    


 


Estimated GFR  > 60 ml/min  08/29/21  14:07    


 


BUN/Creatinine Ratio  18 %  08/26/21  05:59    


 


Glucose  94 mg/dL ()   08/26/21  05:59    


 


Hemoglobin A1c  5.6 % (4-6)   08/26/21  05:59    


 


Calcium  9.0 mg/dL (8.4-10.2)   08/26/21  05:59    


 


Total Bilirubin  0.20 mg/dL (0.1-1.2)   08/26/21  05:59    


 


AST  24 units/L (5-40)   08/26/21  05:59    


 


ALT  22 units/L (7-56)   08/26/21  05:59    


 


Alkaline Phosphatase  111 units/L ()   08/26/21  05:59    


 


Total Protein  6.9 g/dL (6.3-8.2)   08/26/21  05:59    


 


Albumin  3.3 g/dL (3.9-5)  L  08/26/21  05:59    


 


Albumin/Globulin Ratio  0.9 %  08/26/21  05:59    


 


Triglycerides  277 mg/dL (2-149)  H  08/26/21  05:59    


 


Cholesterol  162 mg/dL ()   08/26/21  05:59    


 


LDL Cholesterol Direct  103 mg/dL ()   08/26/21  05:59    


 


HDL Cholesterol  29 mg/dL (40-59)  L  08/26/21  05:59    


 


Cholesterol/HDL Ratio  5.58 %  08/26/21  05:59    








                                Last Vital Signs











Temp  98.7 F   08/30/21 09:06


 


Pulse  89   08/30/21 09:06


 


Resp  20   08/30/21 09:06


 


BP  101/57   08/30/21 09:06


 


Pulse Ox  98   08/30/21 09:06

## 2021-08-31 VITALS — SYSTOLIC BLOOD PRESSURE: 100 MMHG | DIASTOLIC BLOOD PRESSURE: 61 MMHG

## 2021-08-31 RX ADMIN — LINACLOTIDE SCH MCG: 290 CAPSULE, GELATIN COATED ORAL at 09:11

## 2021-08-31 RX ADMIN — APIXABAN SCH MG: 5 TABLET, FILM COATED ORAL at 09:15

## 2021-08-31 RX ADMIN — FUROSEMIDE SCH MG: 20 TABLET ORAL at 09:14

## 2021-08-31 RX ADMIN — BACLOFEN SCH MG: 10 TABLET ORAL at 09:14

## 2021-08-31 RX ADMIN — VENLAFAXINE HYDROCHLORIDE SCH MG: 75 CAPSULE, EXTENDED RELEASE ORAL at 09:13

## 2021-08-31 RX ADMIN — PREGABALIN SCH MG: 50 CAPSULE ORAL at 09:13

## 2021-08-31 NOTE — DISCHARGE SUMMARY
Providers





- Providers


Date of Admission: 


08/26/21 01:56





Date of discharge: 08/31/21


Attending physician: 


LAMINE RUCKER MD





Primary care physician: 


PRIMARY CARE MD








Hospitalization


Reason for admission: psychosis


Admitting Diagnosis: F20.9 - SCHIZOPHRENIA, UNSPECIFIED


Condition: Stable


Hospital course: 


The patient was provided inpatient psychiatric treatment with safe and 

supportive care, medication adjustment, adverse effect monitoring, medical 

evaluations, medical treatments, assessment and psycho-education. The patient's 

mood, cognition, behavior, moral support are improved and stabilized. St the 

time of discharge, the patient had no endangering behavior and no debilitating 

adverse effects. The patient agreed on potential consequences of no treatment 

and gave informed consent. 





08/31 The patient was seen today. Spoke with the patient about her plan for 

discharge and ways to maintain her mental wellness. She is calm, cooperative and

pleasant. The patient denies SI/HI and states she is ready to go home to her 

. She says she lives next door to her dad in one of his rental pr

operties. She says he is selling the property and moving. She says her and her 

 has to move but states she prequalified for a small home. Spoke with the

patient's father while he was talking to the . I had her put the father on 

speaker phone. I informed him that the patient was stable and no a threat to 

herself or anyone and she would be discharged back home. 





08/30 The patient was seen today. She is talkative and pleasant. She denies 

SI/HI or hallucinations of any kind. She does have flight of ideas. She says her

daughter and her father had her placed here because everything she looked at 

looked like spiders. She laughs and says "that was then."





08/29 The patient was seen today. She is asleep but easily arouses. She is still

appears delusional and having flight of ideas. She is talking about her daddy 

thinks she is aggressive. She then starts again talking about spiders, and 

rambling about her daughter. She denies SI/HI or hallucinations. She says she 

slept "pretty good."





08/28 The patient was seen today. She is difficult to follow and is having 

flight of ideas. She is delusional. She is talking about being bitten by a 

spider, and seeing a lot of them. She tells me her daughter and her daddy wanted

her here. She says her daughter doesn't like her very well. The patient says she

takes care of her  who say Alzheimer. She then says "nobody liked me 

talking about the spiders. The house is full of them." She denies SI/HI or 

hallucinations. She says "I'm not delusional or anything." The patient then 

laughs and starts talking about her "daughter and her daddy wanted her here."





08/27/2021: The patient was seen resting in bed, she reports doing well. She 

states that she spoke to her  yesterday and their conversation went well.

She reports sleep and appetite as good " I have gotten some sleep and it made a 

big difference." She denies any current suicidal/homicidal ideation and denies 

hallucinations. Per nurse, the patient had a quiet night. No changes made today.





08/26 Tessa Deluca is a 61 year old female with a history of Bipolar, Anxiety 

and multiple medical diagnoses who was admitted from [facility] on an 

ISA due to aggressive behavior and hallucinations. In my interview with the 

patient, she presents with some confusion. The patient is unable to states her 

psychiatric diagnosis or what medications she is taking. The patient states that

she has never seen a psychiatric but states her PCP prescribes her psychotropic 

medications. The patient states her  has Alzheimer and they reside at her

father's house stating " My daddy wants to get me out of his house, I take care 

of my . " She denies any current suicidal/homicidal ideation and denies 

hallucinations.





Per ISA note from daughter: "My mother for years has exhibited altered mental 

states like seeing things not there, bugs " blue people"  and has been having 

spontaneous mood changes for 10+ years. She is now on at least seventeen- twenty

medications and has sbeen seen at both hospitals here in town for this issue. 

T[facility] has sent her to [facility] for 2 weeks. She can be hostile, screaming, 

calling 911 on my grandfather for not answering phone, throwing rocks at widows.

she blames people for things that never happened..."


Disposition: 01 HOME / SELF CARE / HOMELESS


Time spent for discharge: 35


Allergies/Adverse Reactions: 


                                    Allergies





Penicillins Adverse Reaction (Verified 08/25/21 22:57)


   Hives


succinylcholine Adverse Reaction (Verified 08/25/21 23:00)


   Unknown


sulfamethoxazole Adverse Reaction (Verified 08/25/21 23:03)


   Hives








Vital Signs: 


                                Last Vital Signs











Temp  98.0 F   08/31/21 08:16


 


Pulse  90   08/31/21 09:15


 


Resp  20   08/31/21 08:16


 


BP  100/61   08/31/21 08:16


 


Pulse Ox  94   08/31/21 08:16











Last Lab: 


                             Laboratory Last Values











WBC  6.3 K/mm3 (4.5-11.0)   08/30/21  06:05    


 


RBC  3.62 M/mm3 (3.65-5.03)  L  08/30/21  06:05    


 


Hgb  11.0 gm/dl (10.1-14.3)   08/30/21  06:05    


 


Hct  33.0 % (30.3-42.9)   08/30/21  06:05    


 


MCV  91 fl (79-97)   08/30/21  06:05    


 


MCH  30 pg (28-32)   08/30/21  06:05    


 


MCHC  33 % (30-34)   08/30/21  06:05    


 


RDW  15.2 % (13.2-15.2)   08/30/21  06:05    


 


Plt Count  215 K/mm3 (140-440)   08/30/21  06:05    


 


Lymph % (Auto)  30.3 % (13.4-35.0)   08/26/21  05:59    


 


Mono % (Auto)  9.0 % (0.0-7.3)  H  08/26/21  05:59    


 


Eos % (Auto)  6.0 % (0.0-4.3)  H  08/26/21  05:59    


 


Baso % (Auto)  1.5 % (0.0-1.8)   08/26/21  05:59    


 


Lymph # (Auto)  1.9 K/mm3 (1.2-5.4)   08/26/21  05:59    


 


Mono # (Auto)  0.6 K/mm3 (0.0-0.8)   08/26/21  05:59    


 


Eos # (Auto)  0.4 K/mm3 (0.0-0.4)   08/26/21  05:59    


 


Baso # (Auto)  0.1 K/mm3 (0.0-0.1)   08/26/21  05:59    


 


Seg Neutrophils %  53.2 % (40.0-70.0)   08/26/21  05:59    


 


Seg Neutrophils #  3.3 K/mm3 (1.8-7.7)   08/26/21  05:59    


 


PT  13.8 Sec. (12.2-14.9)   08/26/21  09:55    


 


INR  1.00  (0.87-1.13)   08/26/21  09:55    


 


APTT  27.5 Sec. (24.2-36.6)   08/26/21  09:55    


 


Sodium  135 mmol/L (137-145)  L  08/26/21  05:59    


 


Potassium  3.7 mmol/L (3.6-5.0)   08/26/21  05:59    


 


Chloride  97.3 mmol/L ()  L  08/26/21  05:59    


 


Carbon Dioxide  29 mmol/L (22-30)   08/26/21  05:59    


 


Anion Gap  12 mmol/L  08/26/21  05:59    


 


BUN  11 mg/dL (7-17)   08/26/21  05:59    


 


Creatinine  0.7 mg/dL (0.6-1.2)   08/29/21  14:07    


 


Estimated GFR  > 60 ml/min  08/29/21  14:07    


 


BUN/Creatinine Ratio  18 %  08/26/21  05:59    


 


Glucose  94 mg/dL ()   08/26/21  05:59    


 


Hemoglobin A1c  5.6 % (4-6)   08/26/21  05:59    


 


Calcium  9.0 mg/dL (8.4-10.2)   08/26/21  05:59    


 


Total Bilirubin  0.20 mg/dL (0.1-1.2)   08/26/21  05:59    


 


AST  24 units/L (5-40)   08/26/21  05:59    


 


ALT  22 units/L (7-56)   08/26/21  05:59    


 


Alkaline Phosphatase  111 units/L ()   08/26/21  05:59    


 


Total Protein  6.9 g/dL (6.3-8.2)   08/26/21  05:59    


 


Albumin  3.3 g/dL (3.9-5)  L  08/26/21  05:59    


 


Albumin/Globulin Ratio  0.9 %  08/26/21  05:59    


 


Triglycerides  277 mg/dL (2-149)  H  08/26/21  05:59    


 


Cholesterol  162 mg/dL ()   08/26/21  05:59    


 


LDL Cholesterol Direct  103 mg/dL ()   08/26/21  05:59    


 


HDL Cholesterol  29 mg/dL (40-59)  L  08/26/21  05:59    


 


Cholesterol/HDL Ratio  5.58 %  08/26/21  05:59    














Core Measure Documentation





- Palliative Care


Palliative Care/ Comfort Measures: Not Applicable





- Core Measures


Any of the following diagnoses?: none





Exam





- Constitutional


Vitals: 


                                        











Temp Pulse Resp BP Pulse Ox


 


 98.0 F   90   20   100/61   94 


 


 08/31/21 08:16  08/31/21 09:15  08/31/21 08:16  08/31/21 08:16  08/31/21 08:16











General appearance: Present: no acute distress





- EENT


Eyes: Present: PERRL, EOM intact


ENT: hearing intact, clear oral mucosa





- Neck


Neck: Present: supple, normal ROM





- Respiratory


Respiratory effort: normal





Plan


Activity: advance as tolerated


Weight Bearing Status: Weight Bear as Tolerated


Care Plan Goals: 


Maintain good and stable mental health


Plan of Treatment: 


The patient should be compliant with medications, not to use drugs, and not to 

drink alcohol. The patient understands that if suicidal ideas, homicidal ideas 

or any endangering feeling arise, the patient should seek assistance including, 

but not limited to crisis hotline, and emergency room.


Assessment: 


Psychosis


Follow up with: 


PRIMARY CARE,MD [Primary Care Provider] - 7 Days